# Patient Record
Sex: FEMALE
[De-identification: names, ages, dates, MRNs, and addresses within clinical notes are randomized per-mention and may not be internally consistent; named-entity substitution may affect disease eponyms.]

---

## 2020-08-27 ENCOUNTER — NURSE TRIAGE (OUTPATIENT)
Dept: OTHER | Facility: CLINIC | Age: 35
End: 2020-08-27

## 2020-08-27 NOTE — TELEPHONE ENCOUNTER
Reason for Disposition   [1] Vomiting AND [2] persists > 4 hours    Answer Assessment - Initial Assessment Questions  1. SYMPTOM: \"What's the main symptom you're concerned about? \" (e.g., pain, fever, vomiting)      Vomiting, dizzy, headache   2. ONSET: \"When did it  start? \"      Yesterday   3. SURGERY: \"What surgery was performed? \"      Stent placed in right kidney for kidney stone   4. DATE of SURGERY: \"When was surgery performed? \"       2 days ago   5. ANESTHESIA: \" What type of anesthesia did you have? \" (e.g., general, spinal, epidural, local)      General   6. PAIN: \"Is there any pain? \" If so, ask: \"How bad is it? \"  (Scale 1-10; or mild, moderate, severe)      Headache-8/10  7. FEVER: \"Do you have a fever? \" If so, ask: \"What is your temperature, how was it measured, and when did it start? \"      Denies   8. VOMITING: \"Is there any vomiting? \" If yes, ask: \"How many times? \"      6  9. BLEEDING: \"Is there any bleeding? \" If so, ask: \"How much? \" and \"Where? \"      Denies   10. OTHER SYMPTOMS: \"Do you have any other symptoms? \" (e.g., drainage from wound, painful urination, constipation)        Little bit of blood in urine    Protocols used: POST-OP SYMPTOMS AND QUESTIONS-[facility]-    This triage is a result of a call to [facility] a Nurse. Please do not respond to the triage nurse through this encounter. Any subsequent communication should be directly with the patient. Patient triaged using appropriate protocol. Care advice given per protocol. Patient/Caregiver verbalized understanding of care advice and disposition.